# Patient Record
Sex: FEMALE | Race: WHITE | NOT HISPANIC OR LATINO | Employment: FULL TIME | URBAN - NONMETROPOLITAN AREA
[De-identification: names, ages, dates, MRNs, and addresses within clinical notes are randomized per-mention and may not be internally consistent; named-entity substitution may affect disease eponyms.]

---

## 2023-05-17 ENCOUNTER — OFFICE VISIT (OUTPATIENT)
Dept: CARDIOLOGY CLINIC | Facility: CLINIC | Age: 43
End: 2023-05-17
Payer: COMMERCIAL

## 2023-05-17 VITALS
HEART RATE: 79 BPM | SYSTOLIC BLOOD PRESSURE: 128 MMHG | WEIGHT: 203.8 LBS | OXYGEN SATURATION: 99 % | BODY MASS INDEX: 37.5 KG/M2 | DIASTOLIC BLOOD PRESSURE: 84 MMHG | HEIGHT: 62 IN

## 2023-05-17 DIAGNOSIS — R00.2 PALPITATIONS: Primary | ICD-10-CM

## 2023-05-17 DIAGNOSIS — E87.6 HYPOKALEMIA: ICD-10-CM

## 2023-05-17 DIAGNOSIS — E78.2 MIXED HYPERLIPIDEMIA: ICD-10-CM

## 2023-05-17 DIAGNOSIS — I47.1 SVT (SUPRAVENTRICULAR TACHYCARDIA) (HCC): ICD-10-CM

## 2023-05-17 DIAGNOSIS — R60.0 LOCALIZED EDEMA: ICD-10-CM

## 2023-05-17 PROCEDURE — 99204 OFFICE O/P NEW MOD 45 MIN: CPT | Performed by: INTERNAL MEDICINE

## 2023-05-17 RX ORDER — PROPRANOLOL HYDROCHLORIDE 20 MG/1
20 TABLET ORAL EVERY 12 HOURS SCHEDULED
COMMUNITY

## 2023-05-17 NOTE — PROGRESS NOTES
Cardiology Office Visit    Polina Wilkes  [id number]  1980    [facility]  [address] [place] [address] [phone]      Dear No primary care provider on file.,    I had the pleasure of seeing your patient at our [facility] [place] office today 5/17/2023 . As you know she is a pleasant 41y.o. year old female with a medical history as described below. Patient previously followed with me at the [address] Reason for office visit: Establish care for SVT. 1. Palpitations  Assessment & Plan:  Patient reports episodes of rapid heart rates seen 15 to 20 minutes. Occasional skipping. Patient with known history of SVT status post ablation teenager. I have recommended a [employer] Mobile device as she does not tolerate adhesive and her symptoms typically last long enough to capture. Will discuss the addition of magnesium at follow up. Continue propranolol 20 mg twice daily. Potassium low on last labs. Should have repeat labs done to reevaluate potassium level. 2. SVT (supraventricular tachycardia) (HCC)  Assessment & Plan:  Known history of SVT status post SVT ablation as a teenager. Continue propranolol at current dose for now. [facility] as discussed above. Orders:  -     POCT ECG    3. Mixed hyperlipidemia  Assessment & Plan:  Lipid panel 8/25/2022: C 178. T 175. H 42. L 101. Continue pravastatin 20 mg daily. Should have updated lipid panel 8/2023. Dietary modification for triglycerides. 4. Localized edema  Assessment & Plan:  Patient notes edema over the last year. Euvolemic on exam today. Echocardiogram 2/2023 showed normal EF 60-65% with only trace mitral and mild tricuspid regurgitation. Will continue to monitor for now. Orders:  -     B-Type Natriuretic Peptide(BNP); Future  -     Basic metabolic panel; Future; Expected date: 05/31/2023    5.  Hypokalemia  - Basic metabolic panel; Future; Expected date: 05/31/2023           RENATE Noriega has a past medical history of SVT s/p ablation, palpitations, CVA of unclear etiology in 2017, vasovagal syncope, anxiety and migraines. She was last seen by [facility] cardiology 2/24/2023 for evaluation of palpitations. Echocardiogram 2/16/2023 was normal.     She was diagnosed with SVT at the age of 17 and underwent ablation as a teenager. I had previously followed her at the [address] and she had undergone ILR as should could not tolerate monitors due to adhesives. 5/17/2023Melvin Noriega presents to the office today to establish care. She has had ongoing palpitations. Tells me these can last anywhere from 15 to 20 minutes. Typically palpitations are rapid in rate but regular. She does note an occasional skip. She tells me that last month she had a syncopal episode without injury. She does note episodes of dizziness and  lower extremity edema. She was previously on metoprolol but was transitioned to propranolol 20 mg twice daily due to her history of migraines. She did not tolerate this as she began experiencing diarrhea. Neurology raised her propranolol to 20 mg daily to help with diarrhea but unfortunately she noted increased frequency of tachycardia/palpitations. At her last cardiology visit her propranolol was to 10 mg twice daily. No chest pain. No shortness of breath. She is undergoing GI evaluation.     Patient Active Problem List   Diagnosis   • Residual foreign body in soft tissue   • SVT (supraventricular tachycardia) (HCC)   • Palpitations   • Mixed hyperlipidemia   • Localized edema     Past Medical History:   Diagnosis Date   • Anxiety    • Chronic ear infection    • COVID-19     12/2021   • Encounter for loop recorder at end of battery life    • Migraines    • Palpitations    • Stroke [facility]) 2017   • SVT (supraventricular tachycardia) ([address])     s/p ablation   • Vasovagal syncope      Social History Socioeconomic History   • Marital status: /Civil Union     Spouse name: Not on file   • Number of children: Not on file   • Years of education: Not on file   • Highest education level: Not on file   Occupational History   • Not on file   Tobacco Use   • Smoking status: Never   • Smokeless tobacco: Never   Vaping Use   • Vaping Use: Never used   Substance and Sexual Activity   • Alcohol use: Yes     Comment: rarely   • Drug use: Never   • Sexual activity: Not on file     Comment: Defer   Other Topics Concern   • Not on file   Social History Narrative   • Not on file     Social Determinants of Health     Financial Resource Strain: Not on file   Food Insecurity: Not on file   Transportation Needs: Not on file   Physical Activity: Not on file   Stress: Not on file   Social Connections: Not on file   Intimate Partner Violence: Not on file   Housing Stability: Not on file      History reviewed. No pertinent family history.   Past Surgical History:   Procedure Laterality Date   • ABCESS DRAINAGE      abdominal wall   • ADENOIDECTOMY     • CARDIAC ELECTROPHYSIOLOGY MAPPING AND ABLATION     • CARDIAC LOOP RECORDER     • CHOLECYSTECTOMY     • COLONOSCOPY     • DIAGNOSTIC LAPAROSCOPY     • HYSTERECTOMY      partial   • KNEE ARTHROSCOPY Right    • MYRINGOTOMY W/ TUBES      times 10   • MI INCISION & REMOVAL FOREIGN BODY SUBQ TISS SIMPLE Left 10/20/2022    Procedure: LEFT CHEST REMOVAL OF FOREIGN BODY;  Surgeon: Alexandre Murillo DO;  Location:  MAIN OR;  Service: General   • WISDOM TOOTH EXTRACTION         Current Outpatient Medications:   •  ALPRAZolam (XANAX) 0.25 mg tablet, Take 0.25 mg by mouth daily at bedtime, Disp: , Rfl:   •  aspirin 325 mg tablet, Take 325 mg by mouth daily at bedtime, Disp: , Rfl:   •  busPIRone (BUSPAR) 7.5 mg tablet, Take 7.5 mg by mouth, Disp: , Rfl:   •  ibuprofen (MOTRIN) 600 mg tablet, Take by mouth every 6 (six) hours as needed for mild pain, Disp: , Rfl:   •  ketorolac (TORADOL) 10 mg tablet, Take 10 mg by mouth every 6 (six) hours as needed for moderate pain, Disp: , Rfl:   •  montelukast (SINGULAIR) 10 mg tablet, Take 10 mg by mouth daily at bedtime, Disp: , Rfl:   •  pravastatin (PRAVACHOL) 20 mg tablet, Take 20 mg by mouth daily, Disp: , Rfl:   •  propranolol (INDERAL) 20 mg tablet, Take 20 mg by mouth every 12 (twelve) hours, Disp: , Rfl:   •  Rimegepant Sulfate (NURTEC) 75 MG TBDP, Take 75 mg by mouth if needed, Disp: , Rfl:   •  TiZANidine (ZANAFLEX) 4 MG capsule, Take 4 mg by mouth daily at bedtime, Disp: , Rfl:   •  topiramate (TOPAMAX) 100 mg tablet, Take 150 mg by mouth daily at bedtime, Disp: , Rfl:   •  Eptinezumab-jjmr (Vyepti) 100 MG/ML, Inject 100 mg into a catheter in a vein every 3 (three) months (Patient not taking: Reported on 5/17/2023), Disp: , Rfl:   •  metoprolol succinate (TOPROL-XL) 25 mg 24 hr tablet, Take 25 mg by mouth daily (Patient not taking: Reported on 5/17/2023), Disp: , Rfl:      Allergies   Allergen Reactions   • Polyethylene Glycol Anaphylaxis   • Sulfa Antibiotics Hives   • Amoxicillin Itching   • Keflex [Cephalexin] GI Intolerance     Nausea and vomiting   • Medical Tape Other (See Comments)     Pt reports it can peel skin off. • Oxycodone GI Intolerance     Pt also reports shaking    • Prednisone Itching   • Promethazine Other (See Comments)     Pt reports feeling very "nasty" when she takes it. Cardiac Test Results:     ECG 5/17/2023: Normal sinus rhythm. 75 bpm.  Nonspecific T wave abnormality. Echocardiogram 2/16/2023:   EF 60-65%. Trace mitral and mild tricuspid regurgitation. Lipid panel 8/25/2022: C 178. T 175. H 42. L 101. Review of Systems   Constitutional: Negative for activity change, appetite change and fatigue. HENT: Negative for congestion, hearing loss, tinnitus and trouble swallowing. Eyes: Negative for visual disturbance.    Respiratory: Negative for cough, chest tightness, shortness of breath and wheezing. Cardiovascular: Positive for palpitations and leg swelling. Negative for chest pain. Gastrointestinal: Positive for diarrhea. Negative for abdominal distention, abdominal pain, nausea and vomiting. Genitourinary: Negative for difficulty urinating. Musculoskeletal: Negative for arthralgias. Skin: Negative for rash. Neurological: Positive for dizziness and syncope. Negative for light-headedness. Hematological: Does not bruise/bleed easily. Psychiatric/Behavioral: Negative for confusion. The patient is not nervous/anxious. All other systems reviewed and are negative. Vitals:    05/17/23 1315 05/17/23 1354   BP: 142/90 128/84   Pulse: 79    SpO2: 99%    Weight: 92.4 kg (203 lb 12.8 oz)    Height: 5' 2" (1.575 m)      Vitals:    05/17/23 1315   Weight: 92.4 kg (203 lb 12.8 oz)     Height: 5' 2" (157.5 cm)     Physical Exam  Vitals reviewed. Constitutional:       Appearance: She is well-developed. HENT:      Head: Normocephalic and atraumatic. Eyes:      Conjunctiva/sclera: Conjunctivae normal.      Pupils: Pupils are equal, round, and reactive to light. Neck:      Vascular: No JVD. Cardiovascular:      Rate and Rhythm: Normal rate and regular rhythm. Heart sounds: Normal heart sounds. No murmur heard. No friction rub. No gallop. Pulmonary:      Effort: Pulmonary effort is normal.      Breath sounds: Normal breath sounds. Abdominal:      General: Bowel sounds are normal.      Palpations: Abdomen is soft. Musculoskeletal:      Cervical back: Normal range of motion. Skin:     General: Skin is warm and dry. Neurological:      Mental Status: She is alert and oriented to person, place, and time.    Psychiatric:         Behavior: Behavior normal.

## 2023-05-17 NOTE — LETTER
May 17, 2023     Patient: Kathryn John  YOB: 1980  Date of Visit: 5/17/2023      To Whom it May Concern:    Kathryn John is under my professional care. Francisco Dhaliwal was seen in my office on 5/17/2023. Francisco Dhaliwal may return to work on 5/18/2023. If you have any questions or concerns, please don't hesitate to call.          Sincerely,          Cha Luong, DO

## 2023-05-17 NOTE — PATIENT INSTRUCTIONS
[address] [facility] [address]). ECG today is normal.  Agree with adding potassium for now. Continue with GI evaluation.

## 2023-06-27 ENCOUNTER — TELEPHONE (OUTPATIENT)
Dept: CARDIOLOGY CLINIC | Facility: CLINIC | Age: 43
End: 2023-06-27

## 2023-06-27 NOTE — TELEPHONE ENCOUNTER
----- Message from Kiah Ortiz sent at 6/26/2023  5:57 PM EDT -----  Please let patient know that her BNP lab test (test for fluid stretch in the heart) was normal

## 2023-07-14 ENCOUNTER — TELEPHONE (OUTPATIENT)
Dept: CARDIOLOGY CLINIC | Facility: CLINIC | Age: 43
End: 2023-07-14

## 2023-07-14 ENCOUNTER — TELEMEDICINE (OUTPATIENT)
Dept: CARDIOLOGY CLINIC | Facility: CLINIC | Age: 43
End: 2023-07-14
Payer: COMMERCIAL

## 2023-07-14 VITALS — BODY MASS INDEX: 36.07 KG/M2 | WEIGHT: 196 LBS | HEIGHT: 62 IN

## 2023-07-14 DIAGNOSIS — E78.2 MIXED HYPERLIPIDEMIA: ICD-10-CM

## 2023-07-14 DIAGNOSIS — R00.2 PALPITATIONS: ICD-10-CM

## 2023-07-14 DIAGNOSIS — I47.1 SVT (SUPRAVENTRICULAR TACHYCARDIA) (HCC): ICD-10-CM

## 2023-07-14 DIAGNOSIS — R60.0 LOCALIZED EDEMA: Primary | ICD-10-CM

## 2023-07-14 PROCEDURE — 93000 ELECTROCARDIOGRAM COMPLETE: CPT | Performed by: INTERNAL MEDICINE

## 2023-07-14 PROCEDURE — G2012 BRIEF CHECK IN BY MD/QHP: HCPCS | Performed by: INTERNAL MEDICINE

## 2023-07-14 RX ORDER — HYDROCHLOROTHIAZIDE 25 MG/1
25 TABLET ORAL DAILY
Qty: 30 TABLET | Refills: 11 | Status: SHIPPED | OUTPATIENT
Start: 2023-07-14

## 2023-07-14 NOTE — ASSESSMENT & PLAN NOTE
Patient reports episodes of rapid heart rates seen 15 to 20 minutes. Occasional skipping. Patient with known history of SVT status post ablation teenager. I have recommended a [employer] Mobile device as she does not tolerate adhesive and her symptoms typically last long enough to capture. Will discuss the addition of magnesium at follow up. Continue propranolol 20 mg twice daily. Potassium low on last labs. Should have repeat labs done to reevaluate potassium level.

## 2023-07-14 NOTE — PROGRESS NOTES
Virtual Brief Visit    This Visit is being completed by telephone. The Patient is located at Home and in the following state in which I hold an active license [place]    The patient was identified by name and date of birth. Danielle Bhagat was informed that this is a telemedicine visit and that the visit is being conducted through Telephone. My office door was closed. No one else was in the room. The patient has agreed to participate and understands they can discontinue the visit at any time. Patient is aware this is a billable service. Assessment/Plan:    Cathy Haywood continues to have palpitations as outlined at her initial evaluation. She was seen by GI (Dr. Pattie Sales). No EGD/colonoscopy ordered. She did not yet get [facility] as her  was between jobs. Problem List Items Addressed This Visit        A    Palpitations     Patient reports episodes of rapid heart rates lasting at times 15 to 20 minutes. Occasional skipping. Patient with known history of SVT status post ablation teenager. I had recommended a [employer] Mobile device as she does not tolerate adhesive and her symptoms typically last long enough to capture. She has not yet obtained this but will as her symptoms are still present. This will be helpful to determine exactly what she is feeling and we will have the ability to document any abnormal rhythm if present. Will discuss the addition of magnesium at follow up. Continue propranolol 20 mg twice daily. Potassium low on last labs. Should have repeat labs done to reevaluate potassium level with the addition of HCTZ as she may need potassium supplementation. Relevant Medications    propranolol (INDERAL) 20 mg tablet       B    SVT (supraventricular tachycardia) (HCC)     Known history of SVT status post SVT ablation as a teenager. Continue propranolol at current dose. [facility] as discussed above.           Relevant Medications    propranolol (INDERAL) 20 mg tablet C    Mixed hyperlipidemia     Lipid panel 8/25/2022: C 178. T 175. H 42. L 101. Continue pravastatin 20 mg daily. Should have updated lipid panel 8/2023. Dietary modification for triglycerides. D    Localized edema - Primary     Patient notes edema over the last year. Euvolemic on exam at prior office visit. Echocardiogram 2/2023 showed normal EF 60-65% with only trace mitral and mild tricuspid regurgitation. Will do a trial of HCTZ 25 mg daily. Updated labs in 2 weeks (prior history of lower than normal potasium). If potasium does not tolerate HCTZ, we can do a trial of spironolactone in its place. Relevant Medications    hydrochlorothiazide (HYDRODIURIL) 25 mg tablet    Other Relevant Orders    Basic metabolic panel       Recent Visits  No visits were found meeting these conditions. Showing recent visits within past 7 days and meeting all other requirements  Future Appointments  No visits were found meeting these conditions.   Showing future appointments within next 150 days and meeting all other requirements         Visit Time  Total Visit Duration: 15

## 2023-07-14 NOTE — ASSESSMENT & PLAN NOTE
Lipid panel 8/25/2022: C 178. T 175. H 42. L 101. Continue pravastatin 20 mg daily. Should have updated lipid panel 8/2023. Dietary modification for triglycerides.

## 2023-07-14 NOTE — ASSESSMENT & PLAN NOTE
Known history of SVT status post SVT ablation as a teenager. Continue propranolol at current dose for now. [address] as discussed above.

## 2023-07-14 NOTE — ASSESSMENT & PLAN NOTE
Patient notes edema over the last year. Euvolemic on exam today. Echocardiogram 2/2023 showed normal EF 60-65% with only trace mitral and mild tricuspid regurgitation. Will continue to monitor for now.

## 2023-08-01 RX ORDER — PROPRANOLOL HYDROCHLORIDE 20 MG/1
20 TABLET ORAL EVERY 12 HOURS SCHEDULED
Qty: 180 TABLET | Refills: 3 | Status: SHIPPED | OUTPATIENT
Start: 2023-08-01

## 2023-08-01 NOTE — ASSESSMENT & PLAN NOTE
Known history of SVT status post SVT ablation as a teenager. Continue propranolol at current dose. [facility] as discussed above.

## 2023-08-01 NOTE — ASSESSMENT & PLAN NOTE
Patient reports episodes of rapid heart rates lasting at times 15 to 20 minutes. Occasional skipping. Patient with known history of SVT status post ablation teenager. I had recommended a [employer] device as she does not tolerate adhesive and her symptoms typically last long enough to capture. She has not yet obtained this but will as her symptoms are still present. This will be helpful to determine exactly what she is feeling and we will have the ability to document any abnormal rhythm if present. Will discuss the addition of magnesium at follow up. Continue propranolol 20 mg twice daily. Potassium low on last labs. Should have repeat labs done to reevaluate potassium level with the addition of HCTZ as she may need potassium supplementation.

## 2023-08-01 NOTE — ASSESSMENT & PLAN NOTE
Patient notes edema over the last year. Euvolemic on exam at prior office visit. Echocardiogram 2/2023 showed normal EF 60-65% with only trace mitral and mild tricuspid regurgitation. Will do a trial of HCTZ 25 mg daily. Updated labs in 2 weeks (prior history of lower than normal potasium). If potasium does not tolerate HCTZ, we can do a trial of spironolactone in its place.